# Patient Record
Sex: FEMALE | Race: BLACK OR AFRICAN AMERICAN | NOT HISPANIC OR LATINO | Employment: FULL TIME | RURAL
[De-identification: names, ages, dates, MRNs, and addresses within clinical notes are randomized per-mention and may not be internally consistent; named-entity substitution may affect disease eponyms.]

---
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
## 2024-02-23 ENCOUNTER — OFFICE VISIT (OUTPATIENT)
Dept: OBSTETRICS AND GYNECOLOGY | Facility: CLINIC | Age: 19
End: 2024-02-23
Payer: MEDICAID

## 2024-02-23 VITALS
OXYGEN SATURATION: 99 % | WEIGHT: 215 LBS | TEMPERATURE: 98 F | HEIGHT: 60 IN | SYSTOLIC BLOOD PRESSURE: 108 MMHG | RESPIRATION RATE: 17 BRPM | BODY MASS INDEX: 42.21 KG/M2 | DIASTOLIC BLOOD PRESSURE: 73 MMHG | HEART RATE: 92 BPM

## 2024-02-23 DIAGNOSIS — Z72.51 HIGH RISK HETEROSEXUAL BEHAVIOR: ICD-10-CM

## 2024-02-23 DIAGNOSIS — N92.6 IRREGULAR MENSES: Primary | ICD-10-CM

## 2024-02-23 DIAGNOSIS — E66.01 OBESITY, MORBID, BMI 40.0-49.9: ICD-10-CM

## 2024-02-23 DIAGNOSIS — Z11.3 ENCOUNTER FOR SCREENING FOR INFECTIONS WITH A PREDOMINANTLY SEXUAL MODE OF TRANSMISSION: ICD-10-CM

## 2024-02-23 LAB
25(OH)D3 SERPL-MCNC: 8.9 NG/ML
B-HCG UR QL: NEGATIVE
CANDIDA SPECIES: NEGATIVE
CTP QC/QA: YES
EST. AVERAGE GLUCOSE BLD GHB EST-MCNC: 105 MG/DL
GARDNERELLA: POSITIVE
HBA1C MFR BLD HPLC: 5.3 % (ref 4.5–6.6)
T4 FREE SERPL-MCNC: 0.95 NG/DL (ref 0.76–1.46)
TRICHOMONAS: NEGATIVE
TSH SERPL DL<=0.005 MIU/L-ACNC: 0.86 UIU/ML (ref 0.36–3.74)

## 2024-02-23 PROCEDURE — 83036 HEMOGLOBIN GLYCOSYLATED A1C: CPT | Mod: ,,, | Performed by: CLINICAL MEDICAL LABORATORY

## 2024-02-23 PROCEDURE — 3078F DIAST BP <80 MM HG: CPT | Mod: CPTII,,, | Performed by: ADVANCED PRACTICE MIDWIFE

## 2024-02-23 PROCEDURE — 87491 CHLMYD TRACH DNA AMP PROBE: CPT | Mod: ,,, | Performed by: CLINICAL MEDICAL LABORATORY

## 2024-02-23 PROCEDURE — 3008F BODY MASS INDEX DOCD: CPT | Mod: CPTII,,, | Performed by: ADVANCED PRACTICE MIDWIFE

## 2024-02-23 PROCEDURE — 84439 ASSAY OF FREE THYROXINE: CPT | Mod: ,,, | Performed by: CLINICAL MEDICAL LABORATORY

## 2024-02-23 PROCEDURE — 87660 TRICHOMONAS VAGIN DIR PROBE: CPT | Mod: ,,, | Performed by: CLINICAL MEDICAL LABORATORY

## 2024-02-23 PROCEDURE — 87591 N.GONORRHOEAE DNA AMP PROB: CPT | Mod: ,,, | Performed by: CLINICAL MEDICAL LABORATORY

## 2024-02-23 PROCEDURE — 3044F HG A1C LEVEL LT 7.0%: CPT | Mod: CPTII,,, | Performed by: ADVANCED PRACTICE MIDWIFE

## 2024-02-23 PROCEDURE — 87480 CANDIDA DNA DIR PROBE: CPT | Mod: ,,, | Performed by: CLINICAL MEDICAL LABORATORY

## 2024-02-23 PROCEDURE — 99213 OFFICE O/P EST LOW 20 MIN: CPT | Mod: ,,, | Performed by: ADVANCED PRACTICE MIDWIFE

## 2024-02-23 PROCEDURE — 84443 ASSAY THYROID STIM HORMONE: CPT | Mod: ,,, | Performed by: CLINICAL MEDICAL LABORATORY

## 2024-02-23 PROCEDURE — 82306 VITAMIN D 25 HYDROXY: CPT | Mod: ,,, | Performed by: CLINICAL MEDICAL LABORATORY

## 2024-02-23 PROCEDURE — 81025 URINE PREGNANCY TEST: CPT | Mod: RHCUB | Performed by: ADVANCED PRACTICE MIDWIFE

## 2024-02-23 PROCEDURE — 87510 GARDNER VAG DNA DIR PROBE: CPT | Mod: ,,, | Performed by: CLINICAL MEDICAL LABORATORY

## 2024-02-23 PROCEDURE — 1159F MED LIST DOCD IN RCRD: CPT | Mod: CPTII,,, | Performed by: ADVANCED PRACTICE MIDWIFE

## 2024-02-23 PROCEDURE — 3074F SYST BP LT 130 MM HG: CPT | Mod: CPTII,,, | Performed by: ADVANCED PRACTICE MIDWIFE

## 2024-02-23 NOTE — PROGRESS NOTES
Patient ID:  Benito Pappas is a 18 y.o. female      Chief Complaint:   Chief Complaint   Patient presents with    Irregular cycles    STD CHECK        HPI:  Benito is in with c/o irregular menses and requesting STD testing. Has new sex partner, denies vag discharge.   LMP: Patient's last menstrual period was 2024. Cycles last 5 days, normal flow, has recently missed a cycle  Sexually active:  yes  Contraceptive: has tried pills and patches but stopped use      Past Medical History:   Diagnosis Date    HSV-1 infection      History reviewed. No pertinent surgical history.    OB History          0    Para   0    Term   0       0    AB   0    Living   0         SAB   0    IAB   0    Ectopic   0    Multiple   0    Live Births   0                 /73 (BP Location: Left arm)   Pulse 92   Temp 98.3 °F (36.8 °C)   Resp 17   Ht 5' (1.524 m)   Wt 97.5 kg (215 lb)   LMP 2024   SpO2 99%   BMI 41.99 kg/m²   Wt Readings from Last 3 Encounters:   24 97.5 kg (215 lb)   23 97.1 kg (214 lb)   23 95.9 kg (211 lb 6.4 oz)          ROS:  Review of Systems   Constitutional: Negative.    Eyes: Negative.    Respiratory: Negative.     Cardiovascular: Negative.    Gastrointestinal: Negative.    Genitourinary:  Positive for menorrhagia.   Musculoskeletal: Negative.    Neurological: Negative.    Psychiatric/Behavioral: Negative.            PHYSICAL EXAM:  Physical Exam  Constitutional:       Appearance: Normal appearance. She is obese.   Eyes:      Extraocular Movements: Extraocular movements intact.   Cardiovascular:      Rate and Rhythm: Normal rate.      Pulses: Normal pulses.   Pulmonary:      Effort: Pulmonary effort is normal.   Musculoskeletal:         General: Normal range of motion.      Cervical back: Normal range of motion.   Skin:     General: Skin is warm and dry.   Neurological:      Mental Status: She is alert and oriented to person, place, and time.   Psychiatric:          Mood and Affect: Mood normal.         Behavior: Behavior normal.         Thought Content: Thought content normal.         Judgment: Judgment normal.          Assessment:  Anyla was seen today for irregular cycles and std check.    Diagnoses and all orders for this visit:    Irregular menses  -     Hemoglobin A1C; Future  -     TSH; Future  -     Vitamin D; Future  -     T4, Free; Future  -     Hemoglobin A1C  -     TSH  -     Vitamin D  -     T4, Free    Encounter for screening for infections with a predominantly sexual mode of transmission  -     Chlamydia/GC, PCR; Future  -     Bacterial Vaginosis; Future  -     Chlamydia/GC, PCR  -     Bacterial Vaginosis    High risk heterosexual behavior  -     Chlamydia/GC, PCR; Future  -     Bacterial Vaginosis; Future  -     POCT urine pregnancy  -     Chlamydia/GC, PCR  -     Bacterial Vaginosis    Obesity, morbid, BMI 40.0-49.9          ICD-10-CM ICD-9-CM    1. Irregular menses  N92.6 626.4 Hemoglobin A1C      TSH      Vitamin D      T4, Free      Hemoglobin A1C      TSH      Vitamin D      T4, Free      2. Encounter for screening for infections with a predominantly sexual mode of transmission  Z11.3 V74.5 Chlamydia/GC, PCR      Bacterial Vaginosis      Chlamydia/GC, PCR      Bacterial Vaginosis      3. High risk heterosexual behavior  Z72.51 V69.2 Chlamydia/GC, PCR      Bacterial Vaginosis      POCT urine pregnancy      Chlamydia/GC, PCR      Bacterial Vaginosis      4. Obesity, morbid, BMI 40.0-49.9  E66.01 278.01           Plan:  UPT negative  Affirm swab self collected  Urine send for GC/CT testing  Blood collected for labs  Will call or send My Chart message after results reviewed  Encouraged use of condoms with each sexual encounter to decrease STD exposure risk  Explained occasionally missing menses is not uncommon d/t hormone fluctuations    Follow up for care as needed.

## 2024-02-24 LAB
CHLAMYDIA BY PCR: NEGATIVE
N. GONORRHOEAE (GC) BY PCR: NEGATIVE

## 2024-02-25 PROBLEM — N92.6 IRREGULAR MENSES: Status: ACTIVE | Noted: 2024-02-25

## 2024-02-25 PROBLEM — E66.01 OBESITY, MORBID, BMI 40.0-49.9: Status: ACTIVE | Noted: 2024-02-25

## 2024-02-26 DIAGNOSIS — E55.9 VITAMIN D DEFICIENCY: ICD-10-CM

## 2024-02-26 DIAGNOSIS — B96.89 BACTERIAL VAGINAL INFECTION: Primary | ICD-10-CM

## 2024-02-26 DIAGNOSIS — N76.0 BACTERIAL VAGINAL INFECTION: Primary | ICD-10-CM

## 2024-02-26 RX ORDER — METRONIDAZOLE 500 MG/1
500 TABLET ORAL 2 TIMES DAILY
Qty: 14 TABLET | Refills: 0 | Status: SHIPPED | OUTPATIENT
Start: 2024-02-26 | End: 2024-03-04

## 2024-02-26 RX ORDER — FLUCONAZOLE 150 MG/1
150 TABLET ORAL
Qty: 2 TABLET | Refills: 0 | Status: SHIPPED | OUTPATIENT
Start: 2024-02-26 | End: 2024-03-05

## 2024-02-26 RX ORDER — ERGOCALCIFEROL 1.25 MG/1
50000 CAPSULE ORAL
Qty: 8 CAPSULE | Refills: 0 | Status: SHIPPED | OUTPATIENT
Start: 2024-02-26 | End: 2024-04-16